# Patient Record
Sex: FEMALE | Race: WHITE | NOT HISPANIC OR LATINO | URBAN - METROPOLITAN AREA
[De-identification: names, ages, dates, MRNs, and addresses within clinical notes are randomized per-mention and may not be internally consistent; named-entity substitution may affect disease eponyms.]

---

## 2012-04-10 LAB — HIV 1&2 EXT: NORMAL

## 2023-05-16 LAB — HPV ABSTRACT: NORMAL

## 2023-06-21 LAB
HPV ABSTRACT: NORMAL
PAP-ABSTRACT: NORMAL

## 2023-11-16 ENCOUNTER — TRANSFERRED RECORDS (OUTPATIENT)
Dept: FAMILY MEDICINE | Facility: CLINIC | Age: 43
End: 2023-11-16

## 2024-07-09 ENCOUNTER — OFFICE VISIT (OUTPATIENT)
Dept: FAMILY MEDICINE | Facility: CLINIC | Age: 44
End: 2024-07-09

## 2024-07-09 VITALS
SYSTOLIC BLOOD PRESSURE: 107 MMHG | WEIGHT: 147.2 LBS | DIASTOLIC BLOOD PRESSURE: 60 MMHG | HEIGHT: 65 IN | BODY MASS INDEX: 24.53 KG/M2 | OXYGEN SATURATION: 99 % | HEART RATE: 64 BPM

## 2024-07-09 DIAGNOSIS — F32.5 DEPRESSION, MAJOR, IN REMISSION (H): ICD-10-CM

## 2024-07-09 DIAGNOSIS — Z86.59 H/O ANOREXIA NERVOSA: ICD-10-CM

## 2024-07-09 DIAGNOSIS — R63.5 WEIGHT GAIN: ICD-10-CM

## 2024-07-09 DIAGNOSIS — F41.1 GAD (GENERALIZED ANXIETY DISORDER): Primary | ICD-10-CM

## 2024-07-09 DIAGNOSIS — N95.1 PERIMENOPAUSAL: ICD-10-CM

## 2024-07-09 DIAGNOSIS — Z86.69 H/O MIGRAINE: ICD-10-CM

## 2024-07-09 PROBLEM — Z83.719 FAMILY HISTORY OF COLONIC POLYPS: Status: ACTIVE | Noted: 2021-04-06

## 2024-07-09 PROCEDURE — 99204 OFFICE O/P NEW MOD 45 MIN: CPT | Performed by: FAMILY MEDICINE

## 2024-07-09 PROCEDURE — G2211 COMPLEX E/M VISIT ADD ON: HCPCS | Performed by: FAMILY MEDICINE

## 2024-07-09 RX ORDER — TRETINOIN 0.25 MG/G
CREAM TOPICAL
COMMUNITY
Start: 2023-05-16

## 2024-07-09 RX ORDER — NORETHINDRONE ACETATE AND ETHINYL ESTRADIOL .03; 1.5 MG/1; MG/1
1 TABLET ORAL DAILY
COMMUNITY
Start: 2024-01-31 | End: 2024-07-09

## 2024-07-09 RX ORDER — OXYCODONE HYDROCHLORIDE 5 MG/1
1 TABLET ORAL EVERY 6 HOURS PRN
COMMUNITY
Start: 2022-10-02 | End: 2024-07-09

## 2024-07-09 RX ORDER — ESCITALOPRAM OXALATE 10 MG/1
10 TABLET ORAL EVERY MORNING
Qty: 90 TABLET | Refills: 3 | Status: SHIPPED | OUTPATIENT
Start: 2024-07-09

## 2024-07-09 RX ORDER — LEVONORGESTREL/ETHIN.ESTRADIOL 0.1-0.02MG
1 TABLET ORAL DAILY
COMMUNITY
Start: 2023-08-24 | End: 2024-07-09

## 2024-07-09 RX ORDER — ESTRADIOL 0.03 MG/D
1 FILM, EXTENDED RELEASE TRANSDERMAL
Status: CANCELLED | OUTPATIENT
Start: 2024-07-11

## 2024-07-09 RX ORDER — ESCITALOPRAM OXALATE 10 MG/1
1 TABLET ORAL EVERY MORNING
COMMUNITY
Start: 2023-09-13 | End: 2024-07-09

## 2024-07-09 NOTE — PROGRESS NOTES
"SUBJECTIVE:    Renae Rubio, is a 44 year old female presenting for the below:     1. MDD/ JORDAN : chronic at baseline plus situational stress related to behavioral health of son. Retrospectively suspects had post partum depression also. Has attempted to ween down to 5 mg lexapro with re emergence of symptoms. Feels best at 10 mg Lexapro daily.   2. Perimenopausal : following with [facility] : low progesterone. Low testoterone. Titrated up to 200 mg progestrone : helps with night sweats. Contemplating starting testosterone and estrogen replacement next guided by that prescriber   3. Increased weight since perimenopause : 138 --> 147 lbs. Very clean diet. Minimal alcohol. Sleeping well. Remains physically active. Strength training (has stepped back a little form this related to injury).   4. H/o migraines : some re emergence since perimenopause.   5. Remote H/o anorexia :  Lowest weight 105 lbs. Completed treatment. Is mindful of triggers. Sister with h/o anorexia / bulimia.     Health maintenance   Mammo: Last 11/2023 : UTD (s/p breast augmentation/ contemplative about removing).  Pap: Last with HPV 5/2023 - Due 5/2028   Colon Screen: Last 2/2023 - Due 2/2026 : 3 year follow up (Fhx of colonic polyps)  HIV - done 4/2012     Family History   Problem Relation Age of Onset    Osteoporosis Mother 74    Rheumatoid Arthritis Father     Dementia Father     Atrial fibrillation Father        OBJECTIVE:  Vitals:    07/09/24 1036   BP: 107/60   Pulse: 64   SpO2: 99%   Weight: 66.8 kg (147 lb 3.2 oz)   Height: 1.66 m (5' 5.35\")    Body mass index is 24.23 kg/m .  General: no acute distress, cooperative with exam.  Psych: mental status normal, mood and affect appropriate.      ASSESSMENT / PLAN:      Presenting to establish care.     Depression, major, in remission (H24)  JORDAN (generalized anxiety disorder)  Stable on lexapro  Has attempted to ween down to 5 mg lexapro with re emergence of symptoms. Feels best at 10 mg " "Lexapro daily.   -     escitalopram (LEXAPRO) 10 MG tablet; Take 1 tablet (10 mg) by mouth every morning    Perimenopausal  following [facility] : low progesterone. Low testoterone. Titrated up to 200 mg progestrone : helps with night sweats. Contemplating starting testosterone and estrogen replacement next guided by that prescriber     Weight gain  since perimenopause : 138 --> 147 lbs.  Discussed focusing on body composition rather than total weight.   Focus on strength training and increasing muscle mass for decreased osteoporosis risk, increased mobility and strngth in upcoming decades of life and improved metabolic profile.    H/O anorexia nervosa  Remote h/o. Currently not active.     H/O migraine  Some re emergence since perimenopause.   Monitor effect of HRT.   Follow up if becoming more frequent despite this.     Follow up:  Annual wellness in 2025    The longitudinal plan of care for the diagnosis(es)/condition(s) as documented were addressed during this visit. Due to the added complexity in care, I will continue to support Renae in the subsequent management and with ongoing continuity of care.    "

## 2024-08-06 ENCOUNTER — OFFICE VISIT (OUTPATIENT)
Dept: FAMILY MEDICINE | Facility: CLINIC | Age: 44
End: 2024-08-06

## 2024-08-06 VITALS
WEIGHT: 145.8 LBS | DIASTOLIC BLOOD PRESSURE: 67 MMHG | SYSTOLIC BLOOD PRESSURE: 90 MMHG | OXYGEN SATURATION: 98 % | HEART RATE: 74 BPM | BODY MASS INDEX: 24 KG/M2

## 2024-08-06 DIAGNOSIS — L24.7 IRRITANT CONTACT DERMATITIS DUE TO PLANTS, EXCEPT FOOD: Primary | ICD-10-CM

## 2024-08-06 PROCEDURE — G2211 COMPLEX E/M VISIT ADD ON: HCPCS

## 2024-08-06 PROCEDURE — 99213 OFFICE O/P EST LOW 20 MIN: CPT

## 2024-08-06 RX ORDER — PREDNISONE 10 MG/1
TABLET ORAL
Qty: 30 TABLET | Refills: 0 | Status: SHIPPED | OUTPATIENT
Start: 2024-08-06 | End: 2024-08-18

## 2024-08-20 ENCOUNTER — OFFICE VISIT (OUTPATIENT)
Dept: FAMILY MEDICINE | Facility: CLINIC | Age: 44
End: 2024-08-20

## 2024-08-20 VITALS
SYSTOLIC BLOOD PRESSURE: 124 MMHG | OXYGEN SATURATION: 98 % | BODY MASS INDEX: 23.77 KG/M2 | WEIGHT: 144.4 LBS | DIASTOLIC BLOOD PRESSURE: 62 MMHG | HEART RATE: 61 BPM

## 2024-08-20 DIAGNOSIS — L24.7 IRRITANT CONTACT DERMATITIS DUE TO PLANTS, EXCEPT FOOD: Primary | ICD-10-CM

## 2024-08-20 PROCEDURE — G2211 COMPLEX E/M VISIT ADD ON: HCPCS | Performed by: FAMILY MEDICINE

## 2024-08-20 PROCEDURE — 99213 OFFICE O/P EST LOW 20 MIN: CPT | Performed by: FAMILY MEDICINE

## 2024-08-20 RX ORDER — PREDNISONE 10 MG/1
TABLET ORAL
Qty: 5 TABLET | Refills: 0 | Status: SHIPPED | OUTPATIENT
Start: 2024-08-20 | End: 2024-08-26

## 2024-08-20 RX ORDER — CLOBETASOL PROPIONATE 0.5 MG/G
CREAM TOPICAL
COMMUNITY
Start: 2024-08-15 | End: 2024-08-20

## 2024-08-20 RX ORDER — CLOBETASOL PROPIONATE 0.5 MG/G
CREAM TOPICAL 2 TIMES DAILY
Qty: 60 G | Refills: 1 | Status: SHIPPED | OUTPATIENT
Start: 2024-08-20

## 2024-08-20 RX ORDER — TRETINOIN 0.25 MG/G
CREAM TOPICAL
Status: CANCELLED | OUTPATIENT
Start: 2024-08-20

## 2024-08-20 NOTE — PROGRESS NOTES
SUBJECTIVE:    Renea Rubio, is a 44 year old female presenting for the below:     1. Recent poison ivy exposure. Issued steroid burst 8/6/2024. Side effect of palpitations with 60 mg dose (taken for 5 days). Has since titrated down from 40 mg daily for 3 days to 20 mg daily for last 7 days. Applying clobetasol (needs refill) and taking benadryl at night.     OBJECTIVE:  Vitals:    08/20/24 1423 08/20/24 1425   BP: (!) 132/101 124/62   Pulse: 61    SpO2: 98%    Weight: 65.5 kg (144 lb 6.4 oz)     Body mass index is 23.77 kg/m .  General: no acute distress, cooperative with exam.  Skin : large dry patches of slightly erythematous skin to anterior shins.  Psych: mental status normal, mood and affect appropriate.      ASSESSMENT / PLAN:      Irritant contact dermatitis due to plants, except food  Weaning down on prednisone (further 3 days of 10mg and 3 days of 5 mg issued)  Continue applying clobetasol (needs refill) and taking benadryl at night.   Anticipate continued improvement to full resolution.   -     predniSONE (DELTASONE) 10 MG tablet; Take 1 tablet (10 mg) by mouth daily for 3 days, THEN 0.5 tablets (5 mg) daily for 3 days.  -     clobetasol propionate (TEMOVATE) 0.05 % external cream; Apply topically 2 times daily    The longitudinal plan of care for the diagnosis(es)/condition(s) as documented were addressed during this visit. Due to the added complexity in care, I will continue to support Renae in the subsequent management and with ongoing continuity of care.

## 2024-10-22 ENCOUNTER — OFFICE VISIT (OUTPATIENT)
Dept: FAMILY MEDICINE | Facility: CLINIC | Age: 44
End: 2024-10-22

## 2024-10-22 VITALS
SYSTOLIC BLOOD PRESSURE: 118 MMHG | WEIGHT: 147.8 LBS | BODY MASS INDEX: 24.33 KG/M2 | HEART RATE: 70 BPM | OXYGEN SATURATION: 100 % | DIASTOLIC BLOOD PRESSURE: 67 MMHG

## 2024-10-22 DIAGNOSIS — J01.10 ACUTE NON-RECURRENT FRONTAL SINUSITIS: Primary | ICD-10-CM

## 2024-10-22 PROCEDURE — G2211 COMPLEX E/M VISIT ADD ON: HCPCS

## 2024-10-22 PROCEDURE — 99213 OFFICE O/P EST LOW 20 MIN: CPT

## 2024-10-22 RX ORDER — PROGESTERONE 100 MG/1
100 CAPSULE ORAL AT BEDTIME
COMMUNITY
Start: 2024-07-31

## 2024-10-22 NOTE — PROGRESS NOTES
Assessment & Plan     Acute non-recurrent frontal sinusitis  S/s consistent with a sinus infection. Rx for Augmentin.   Discussed main side effects from antibiotics can be GI upset, loose stools, etc   The patient does not get regular sinus infections. Referral to ENT not indicated.  Recommend taking with food to help reduce GI complications from antibiotic use.  Call if any serious diarrhea develops within the next several weeks of taking this antibiotics.   Recommended symptomatic cares such as decongestants, expectorants, steroid nasal spray for next few weeks, over the counter anti-inflammatory medications, and/or nasal/sinus lavage with Netti pot or Sinus Rinse Kit. Decongestant nasal sprays can be used, but use with caution and not for more than 3 days. Follow up as needed for new or worsening symptoms or if symptoms fail to improve. Patient agreeable to plan. All questions answered.   - amoxicillin-clavulanate (AUGMENTIN) 875-125 MG tablet  Dispense: 14 tablet; Refill: 0              See Patient Instructions    Return if symptoms worsen or fail to improve, for Follow up.    The longitudinal plan of care for the diagnosis(es)/condition(s) as documented were addressed during this visit. Due to the added complexity in care, I will continue to support Renae in the subsequent management and with ongoing continuity of care.      Subjective   Renae is a 44 year old, presenting for the following health issues:  URI (Possible sinus infection per pt, sore throat & coughing since last Sunday (10/13), dizziness, L side blurry vision as of yesterday  /Neg covid test today, has not tested for flu )    HPI       Concern - URI  Onset: Last Sunday, 9 days  Description: Friday coughing, congestion, lots of drainage. Yesterday dizzy and lost voice over the weekend. Head hurts and left side feels off. Chest sore, dry cough. Feeling fatigues. 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   Intensity: 6-7/10  Progression of Symptoms:   improving  Accompanying Signs & Symptoms: cough, congestion, chest sore, fatigue  Previous history of similar problem: none but working with kids  Precipitating factors:        Worsened by: none  Alleviating factors:        Improved by: none  Therapies tried and outcome: delsym, nyquil, advil        Review of Systems  Constitutional, HEENT, cardiovascular, pulmonary, gi and gu systems are negative, except as otherwise noted.      Objective    /67   Pulse 70   Wt 67 kg (147 lb 12.8 oz)   SpO2 100%   BMI 24.33 kg/m    Body mass index is 24.33 kg/m .  Physical Exam   GENERAL: alert and no distress  HENT: normal cephalic/atraumatic, ear canals and TM's normal, nose and mouth without ulcers or lesions, nasal mucosa edematous , rhinorrhea clear, oropharynx clear, oral mucous membranes moist, and sinuses: frontal tenderness on left  NECK: no adenopathy  RESP: lungs clear to auscultation - no rales, rhonchi or wheezes  CV: regular rate and rhythm, normal S1 S2, no S3 or S4, no murmur, click or rub, no peripheral edema  PSYCH: mentation appears normal, affect normal/bright            Signed Electronically by: DODIE Fabian CNP

## 2024-11-04 ENCOUNTER — OFFICE VISIT (OUTPATIENT)
Dept: FAMILY MEDICINE | Facility: CLINIC | Age: 44
End: 2024-11-04

## 2024-11-04 VITALS
TEMPERATURE: 98.2 F | OXYGEN SATURATION: 98 % | WEIGHT: 144.8 LBS | HEART RATE: 78 BPM | BODY MASS INDEX: 23.84 KG/M2 | DIASTOLIC BLOOD PRESSURE: 63 MMHG | SYSTOLIC BLOOD PRESSURE: 105 MMHG

## 2024-11-04 DIAGNOSIS — R05.8 PRODUCTIVE COUGH: Primary | ICD-10-CM

## 2024-11-04 RX ORDER — AZITHROMYCIN 250 MG/1
250 TABLET, FILM COATED ORAL DAILY
Qty: 6 TABLET | Refills: 0 | Status: SHIPPED | OUTPATIENT
Start: 2024-11-04

## 2024-11-04 RX ORDER — CODEINE PHOSPHATE AND GUAIFENESIN 10; 100 MG/5ML; MG/5ML
1-2 SOLUTION ORAL EVERY 4 HOURS PRN
Qty: 237 ML | Refills: 1 | Status: SHIPPED | OUTPATIENT
Start: 2024-11-04 | End: 2024-11-06

## 2024-11-04 NOTE — PROGRESS NOTES
Assessment & Plan     Productive cough    - XR Chest 2 Views  - azithromycin (ZITHROMAX) 250 MG tablet  Dispense: 6 tablet; Refill: 0    Work on weight loss  Regular exercise    No follow-ups on file.    Subjective   Renae is a 44 year old, presenting for the following health issues:  RECHECK (Seen on 10/22/24- cough , chest pain from coughing so much /Dx sinus infection, Friday started cough again after finishing antibiotic Amoxicillin/, chest is sore, dry cough )    HPI     Cough for for a month    Review of Systems  Constitutional, HEENT, cardiovascular, pulmonary, GI, , musculoskeletal, neuro, skin, endocrine and psych systems are negative, except as otherwise noted.      Objective    /63   Pulse 78   Temp 98.2  F (36.8  C) (Oral)   Wt 65.7 kg (144 lb 12.8 oz)   SpO2 98%   BMI 23.84 kg/m    Body mass index is 23.84 kg/m .  Physical Exam   GENERAL: alert and no distress  EYES: Eyes grossly normal to inspection, PERRL and conjunctivae and sclerae normal  NECK: no adenopathy, no asymmetry, masses, or scars  RESP: lungs clear to auscultation - no rales, rhonchi or wheezes and rhonchi bibasilar  CV: regular rate and rhythm, normal S1 S2, no S3 or S4, no murmur, click or rub, no peripheral edema  ABDOMEN: soft, nontender, no hepatosplenomegaly, no masses and bowel sounds normal  MS: no gross musculoskeletal defects noted, no edema        Signed Electronically by: Jim Buchanan MD

## 2024-11-06 DIAGNOSIS — R05.8 PRODUCTIVE COUGH: ICD-10-CM

## 2024-11-06 NOTE — TELEPHONE ENCOUNTER
11/6/24 current pharmacy is out of stock.  Asking to have sent to  pharmacy on [address]    robitussin

## 2024-11-07 RX ORDER — CODEINE PHOSPHATE AND GUAIFENESIN 10; 100 MG/5ML; MG/5ML
1-2 SOLUTION ORAL EVERY 4 HOURS PRN
COMMUNITY
Start: 2024-11-07

## 2025-02-26 ENCOUNTER — OFFICE VISIT (OUTPATIENT)
Dept: FAMILY MEDICINE | Facility: CLINIC | Age: 45
End: 2025-02-26

## 2025-02-26 VITALS
DIASTOLIC BLOOD PRESSURE: 88 MMHG | SYSTOLIC BLOOD PRESSURE: 117 MMHG | OXYGEN SATURATION: 99 % | HEART RATE: 82 BPM | WEIGHT: 139 LBS | TEMPERATURE: 98.7 F | BODY MASS INDEX: 22.88 KG/M2

## 2025-02-26 DIAGNOSIS — B96.89 ACUTE BACTERIAL SINUSITIS: Primary | ICD-10-CM

## 2025-02-26 DIAGNOSIS — J01.90 ACUTE BACTERIAL SINUSITIS: Primary | ICD-10-CM

## 2025-02-26 PROCEDURE — 99213 OFFICE O/P EST LOW 20 MIN: CPT | Performed by: FAMILY MEDICINE

## 2025-02-26 PROCEDURE — 3074F SYST BP LT 130 MM HG: CPT | Performed by: FAMILY MEDICINE

## 2025-02-26 PROCEDURE — G2211 COMPLEX E/M VISIT ADD ON: HCPCS | Performed by: FAMILY MEDICINE

## 2025-02-26 PROCEDURE — 3079F DIAST BP 80-89 MM HG: CPT | Performed by: FAMILY MEDICINE

## 2025-02-26 RX ORDER — CLOMIPRAMINE HYDROCHLORIDE 25 MG/1
25 CAPSULE ORAL AT BEDTIME
COMMUNITY

## 2025-02-26 ASSESSMENT — PATIENT HEALTH QUESTIONNAIRE - PHQ9
SUM OF ALL RESPONSES TO PHQ QUESTIONS 1-9: 1
SUM OF ALL RESPONSES TO PHQ QUESTIONS 1-9: 1

## 2025-02-26 NOTE — PROGRESS NOTES
SUBJECTIVE:    Renae Rubio, is a 45 year old female presenting for the below:     Sinus pressure, headache, sinus congestion. Copious rhinorrhea.  Advil is not effective   Negative covid test today   Denies chest pain, SOB, fever   Sx onset: 02/22    OBJECTIVE:  Vitals:    02/26/25 1539   BP: 117/88   Pulse: 82   Temp: 98.7  F (37.1  C)   SpO2: 99%   Weight: 63 kg (139 lb)    Body mass index is 22.88 kg/m .    General: no acute distress, cooperative with exam, sitting in darkened room.  Eyes: no injection or drainage.  Ears: TM's and canals show no erythema, discharge, or effusion bilaterally.  Head: exquisite tenderness on percussion of fontal sinuses bilaterally.   Throat: moist mucous membranes, no tonsillar exudate or hypertrophy, posterior oral pharynx non-erythematous without lesions.  Neck: supple, no thyroid nodules or enlargement.  Lungs: clear to auscultation bilaterally, normal respiratory effort.  Heart: regular rate, normal S1 and S2, no murmurs.     ASSESSMENT / PLAN:      Acute bacterial sinusitis  Symptoms for <10 days, but due to severity of symptoms and clinical exam plus patient preference, will manage with Abx. Add in daily nasal steroid spray.   -     amoxicillin-clavulanate (AUGMENTIN) 875-125 MG tablet; Take 1 tablet by mouth 2 times daily for 7 days.    The longitudinal plan of care for the diagnosis(es)/condition(s) as documented were addressed during this visit. Due to the added complexity in care, I will continue to support Renae in the subsequent management and with ongoing continuity of care.

## 2025-06-05 ENCOUNTER — OFFICE VISIT (OUTPATIENT)
Dept: FAMILY MEDICINE | Facility: CLINIC | Age: 45
End: 2025-06-05

## 2025-06-05 VITALS
WEIGHT: 137.2 LBS | HEART RATE: 68 BPM | SYSTOLIC BLOOD PRESSURE: 96 MMHG | OXYGEN SATURATION: 99 % | BODY MASS INDEX: 22.05 KG/M2 | DIASTOLIC BLOOD PRESSURE: 56 MMHG | HEIGHT: 66 IN

## 2025-06-05 DIAGNOSIS — Z01.818 PREOP GENERAL PHYSICAL EXAM: Primary | ICD-10-CM

## 2025-06-05 DIAGNOSIS — F32.5 DEPRESSION, MAJOR, IN REMISSION: ICD-10-CM

## 2025-06-05 DIAGNOSIS — H02.403 DROOPY EYELID, BILATERAL: ICD-10-CM

## 2025-06-05 DIAGNOSIS — F41.1 GAD (GENERALIZED ANXIETY DISORDER): ICD-10-CM

## 2025-06-05 PROCEDURE — 3078F DIAST BP <80 MM HG: CPT

## 2025-06-05 PROCEDURE — 3074F SYST BP LT 130 MM HG: CPT

## 2025-06-05 PROCEDURE — 99214 OFFICE O/P EST MOD 30 MIN: CPT

## 2025-06-05 NOTE — PATIENT INSTRUCTIONS
[facility]       How to Take Your Medication Before Surgery  Preoperative Medication Instructions   Antiplatelet or Anticoagulation Medication Instructions   - We reviewed the medication list and the patient is not on an antiplatelet or anticoagulation medications.    Additional Medication Instructions  We reviewed the medication list and there are no chronic medications that need to be adjusted for this procedure.       Patient Education   Preparing for Your Surgery  For Adults  Getting started  In most cases, a nurse will call to review your health history and instructions. They will give you an arrival time based on your scheduled surgery time. Please be ready to share:  Your doctor's clinic name and phone number  Your medical, surgical, and anesthesia history  A list of allergies and sensitivities  A list of medicines, including herbal treatments and over-the-counter drugs  Whether the patient has a legal guardian (ask how to send us the papers in advance)  Note: You may not receive a call if you were seen at our PAC (Preoperative Assessment Center).  Please tell us if you're pregnant--or if there's any chance you might be pregnant. Some surgeries may injure a fetus (unborn baby), so they require a pregnancy test. Surgeries that are safe for a fetus don't always need a test, and you can choose whether to have one.   Preparing for surgery  Within 10 to 30 days of surgery: Have a pre-op exam (sometimes called an H&P, or History and Physical). This can be done at a clinic or pre-operative center.  If you're having a , you may not need this exam. Talk to your care team.  At your pre-op exam, talk to your care team about all medicines you take. (This includes CBD oil and any drugs, such as THC, marijuana, and other forms of cannabis.) If you need to stop any medicine before surgery, ask when to start taking it again.  This is for your safety. Many medicines and drugs can  make you bleed too much during surgery. Some change how well surgery (anesthesia) drugs work.  Call your insurance company to let them know you're having surgery. (If you don't have insurance, call [phone].)  Call your clinic if there's any change in your health. This includes a scrape or scratch near the surgery site, or any signs of a cold (sore throat, runny nose, cough, rash, fever).  Eating and drinking guidelines  For your safety: Unless your surgeon tells you otherwise, follow the guidelines below.  Eat and drink as normal until 8 hours before you arrive for surgery. After that, no food or milk. You can spit out gum when you arrive.  Drink clear liquids until 2 hours before you arrive. These are liquids you can see through, like water, Gatorade, and Propel Water. They also include plain black coffee and tea (no cream or milk).  No alcohol for 24 hours before you arrive. The night before surgery, stop any drinks that contain THC.  If your care team tells you to take medicine on the morning of surgery, it's okay to take it with a sip of water. No other medicines or drugs are allowed (including CBD oil)--follow your care team's instructions.  If you have questions the day of surgery, call your hospital or surgery center.   Preventing infection  Shower or bathe the night before and the morning of surgery. Follow the instructions your clinic gave you. (If no instructions, use regular soap.)  Don't shave or clip hair near your surgery site. We'll remove the hair if needed.  Don't smoke or vape the morning of surgery. No chewing tobacco for 6 hours before you arrive. A nicotine patch is okay. You may spit out nicotine gum when you arrive.  For some surgeries, the surgeon will tell you to fully quit smoking and nicotine.  We will make every effort to keep you safe from infection. We will:  Clean our hands often with soap and water (or an alcohol-based hand rub).  Clean the skin at your surgery site with a  special soap that kills germs.  Give you a special gown to keep you warm. (Cold raises the risk of infection.)  Wear hair covers, masks, gowns, and gloves during surgery.  Give antibiotic medicine, if prescribed. Not all surgeries need this medicine.  What to bring on the day of surgery  Photo ID and insurance card  Copy of your health care directive, if you have one  Glasses and hearing aids (bring cases)  You can't wear contacts during surgery  Inhaler and eye drops, if you use them (tell us about these when you arrive)  CPAP machine or breathing device, if you use them  A few personal items, if spending the night  If you have . . .  A pacemaker, ICD (cardiac defibrillator), or other implant: Bring the ID card.  An implanted stimulator: Bring the remote control.  A legal guardian: Bring a copy of the certified (court-stamped) guardianship papers.  Please remove any jewelry, including body piercings. Leave jewelry and other valuables at home.  If you're going home the day of surgery  You must have a support person drive you home. They should stay with you overnight, and they may need to help with your self-care.  If you don't have a support person, please tells us as soon as possible. We can help.  After surgery  If it's hard to control your pain or you need more pain medicine, please call your surgeon's office.  Questions?   If you have any questions for your care team, list them here:   ____________________________________________________________________________________________________________________________________________________________________________________________________________________________________________________________  For informational purposes only. Not to replace the advice of your health care provider. Copyright   2003, 2019 [facility]. All rights reserved. Clinically reviewed by Destin Niño MD. [employer] 847140 - REV 02/25.

## 2025-06-05 NOTE — PROGRESS NOTES
Preoperative Evaluation  [facility]  [address] [place] [address]  Phone: [phone]  Fax: [phone]  Primary Provider: Roya Zheng MD  Pre-op Performing Provider: DODIE Edmondson CNP  Jun 5, 2025 6/5/2025   Surgical Information   What procedure is being done? Blepharoplasty    Facility or Hospital where procedure/surgery will be performed: [facility]    Who is doing the procedure / surgery? Dr. Danielle Sorensen   Date of surgery / procedure: 06/10/2025   Time of surgery / procedure: 1300   Where do you plan to recover after surgery? at home with family     Fax number for surgical facility: [phone]    Assessment & Plan     The proposed surgical procedure is considered LOW risk.    Preop general physical exam  Droopy eyelid, bilateral  - reason for procedure     JORDAN (generalized anxiety disorder)  - stable on clomipramine    Depression, major, in remission  - stable on clomipramine               - No identified additional risk factors other than previously addressed    Antiplatelet or Anticoagulation Medication Instructions   - We reviewed the medication list and the patient is not on an antiplatelet or anticoagulation medications.    Additional Medication Instructions  We reviewed the medication list and there are no chronic medications that need to be adjusted for this procedure.    Recommendation  Approval given to proceed with proposed procedure, without further diagnostic evaluation.    Follow-up  No follow-ups on file.    Maddy Macdonald is a 45 year old, presenting for the following:  Pre-Op Exam (Preop for eyelid surgery. Scheduled for 06/10/2025 at [facility] ([address] [facility]) with Dr. Sorensen. )        HPI:                   6/5/2025   Pre-Op Questionnaire   Have you ever had a heart attack or stroke? No   Have you ever had surgery on your heart or blood vessels, such as a stent placement, a coronary artery bypass, or surgery on an  artery in your head, neck, heart, or legs? No   Do you have chest pain with activity? No   Do you have a history of heart failure? No   Do you currently have a cold, bronchitis or symptoms of other infection? No   Do you have a cough, shortness of breath, or wheezing? No   Do you or anyone in your family have previous history of blood clots? No   Do you or does anyone in your family have a serious bleeding problem such as prolonged bleeding following surgeries or cuts? No   Have you ever had problems with anemia or been told to take iron pills? No   Have you had any abnormal blood loss such as black, tarry or bloody stools, or abnormal vaginal bleeding? No   Have you ever had a blood transfusion? No   Are you willing to have a blood transfusion if it is medically needed before, during, or after your surgery? Yes   Have you or any of your relatives ever had problems with anesthesia? No   Do you have sleep apnea, excessive snoring or daytime drowsiness? No   Do you have any artifical heart valves or other implanted medical devices like a pacemaker, defibrillator, or continuous glucose monitor? No   Do you have artificial joints? No   Are you allergic to latex? No     Advance Care Planning    Did not discuss     Preoperative Review of    reviewed - no record of controlled substances prescribed.          Patient Active Problem List    Diagnosis Date Noted    JORDAN (generalized anxiety disorder) 07/09/2024     Priority: Medium    Perimenopausal 07/09/2024     Priority: Medium    Depression, major, in remission 04/06/2021     Priority: Medium    Family history of colonic polyps 04/06/2021     Priority: Medium      No past medical history on file.  No past surgical history on file.  Current Outpatient Medications   Medication Sig Dispense Refill    clomiPRAMINE (ANAFRANIL) 25 MG capsule Take 25 mg by mouth at bedtime.      DM-Doxylamine-Acetaminophen (NYQUIL COLD & FLU PO) Take by mouth once. (Patient not taking:  "Reported on 6/5/2025)      progesterone (PROMETRIUM) 100 MG capsule Take 100 mg by mouth at bedtime. (Patient not taking: Reported on 6/5/2025)      tretinoin (RETIN-A) 0.025 % external cream Apply topically to affected area(s) at bedtime. (Patient not taking: Reported on 6/5/2025)         Allergies   Allergen Reactions    Sulfa Antibiotics Rash     Per EFP Chart        Social History     Tobacco Use    Smoking status: Never    Smokeless tobacco: Never   Substance Use Topics    Alcohol use: Not on file     Family History   Problem Relation Age of Onset    Osteoporosis Mother 74    Rheumatoid Arthritis Father     Dementia Father     Atrial fibrillation Father      History   Drug Use Not on file             Review of Systems  Constitutional, neuro, ENT, endocrine, pulmonary, cardiac, gastrointestinal, genitourinary, musculoskeletal, integument and psychiatric systems are negative, except as otherwise noted.    Objective    BP 96/56   Pulse 68   Ht 1.664 m (5' 5.5\")   Wt 62.2 kg (137 lb 3.2 oz)   SpO2 99%   BMI 22.48 kg/m     Estimated body mass index is 22.48 kg/m  as calculated from the following:    Height as of this encounter: 1.664 m (5' 5.5\").    Weight as of this encounter: 62.2 kg (137 lb 3.2 oz).  Physical Exam  GENERAL: alert and no distress  EYES: Eyes grossly normal to inspection, PERRL and conjunctivae and sclerae normal  HENT: ear canals and TM's normal, nose and mouth without ulcers or lesions  NECK: no adenopathy, no asymmetry, masses, or scars  RESP: lungs clear to auscultation - no rales, rhonchi or wheezes  CV: regular rate and rhythm, normal S1 S2, no S3 or S4, no murmur, click or rub, no peripheral edema  ABDOMEN: soft, nontender, no hepatosplenomegaly, no masses and bowel sounds normal  MS: no gross musculoskeletal defects noted, no edema  SKIN: no suspicious lesions or rashes  NEURO: Normal strength and tone, mentation intact and speech normal  PSYCH: mentation appears normal, affect " "normal/bright    No results for input(s): \"HGB\", \"PLT\", \"INR\", \"NA\", \"POTASSIUM\", \"CR\", \"A1C\" in the last 8760 hours.     Diagnostics  No labs were ordered during this visit.   No EKG required for low risk surgery (cataract, skin procedure, breast biopsy, etc).    Revised Cardiac Risk Index (RCRI)  The patient has the following serious cardiovascular risks for perioperative complications:   - No serious cardiac risks = 0 points     RCRI Interpretation: 0 points: Class I (very low risk - 0.4% complication rate)         Signed Electronically by: DODIE Edmondson CNP  A copy of this evaluation report is provided to the requesting physician.         "